# Patient Record
(demographics unavailable — no encounter records)

---

## 2025-01-03 NOTE — PHYSICAL EXAM
[Abdomen Masses] : No abdominal masses [Abdomen Tenderness] : ~T No ~M abdominal tenderness [JVD] : no jugular venous distention  [Respiratory Effort] : normal respiratory effort [No Rash or Lesion] : No rash or lesion [Alert] : alert [Calm] : calm [de-identified] : Resolving thrombosed external hemorrhoids without active bleeding.  Mildly tender to palpation.  No erythema. [de-identified] : No acute distress

## 2025-01-03 NOTE — HISTORY OF PRESENT ILLNESS
[FreeTextEntry1] : 45-year-old female here for initial evaluation for painful hemorrhoids.  Recently noticed some significant swelling in the perianal area.  Has significant pain that is slowly been improving.  Has been using Tucks wipes with some relief.  No significant bleeding.  Has had symptomatic hemorrhoids previously with pregnancies but otherwise overall doing well.  Denies fevers.  No prior treatment for hemorrhoids.  Has no prior colonoscopy.  No family history of colon cancer.

## 2025-01-03 NOTE — ASSESSMENT
[FreeTextEntry1] : Ms. LIANG is a 45 year female who presents for symptomatic hemorrhoids. Found to have a thrombosed external hemorrhoid on exam.   Given the findings today on exam and timing of presentation recommend starting with medical management of thrombosed external hemorrhoids. Recommend warm baths, sitz baths or showers as well as pain control with tylenol or ibuprofen. Pain will continue to resolve over the next week and the hemorrhoid will continue to shrink in size. Will likely have a small residual tag once it resolves. Recommend daily fiber supplement such as Metamucil with 1 scoop in a glass of water in the morning. Daily fiber intake recommendation is 30 gm. Recommend 6-8 glasses of noncaffeinated beverage daily to help with constipation symptoms.   Discussed overall management options for hemorrhoids including medical management with fiber supplements, increased fluid intake, and symptom management during acute flares, office based procedures including rubber band ligation, and surgical excision.   If symptoms do not improve or return recommend follow up to consider excision if significant pain persists.  Also discussed screening colonoscopy with the patient she recently turned 45.  Reviewed recommendations for screening.  Reviewed the role of colonoscopy to assess for polyps and subsequent removal.  Discussed risk benefits procedure.  Risk discussed include infection, bleeding, perforation, missed polyps, inadequate bowel prep.  Patient will schedule for screening colonoscopy in early February.  Bowel prep instructions provided.